# Patient Record
Sex: MALE | Race: WHITE | Employment: FULL TIME | URBAN - METROPOLITAN AREA
[De-identification: names, ages, dates, MRNs, and addresses within clinical notes are randomized per-mention and may not be internally consistent; named-entity substitution may affect disease eponyms.]

---

## 2018-02-26 ENCOUNTER — OFFICE VISIT (OUTPATIENT)
Dept: INTERNAL MEDICINE CLINIC | Facility: CLINIC | Age: 23
End: 2018-02-26

## 2018-02-26 VITALS
HEIGHT: 74 IN | SYSTOLIC BLOOD PRESSURE: 119 MMHG | DIASTOLIC BLOOD PRESSURE: 69 MMHG | RESPIRATION RATE: 16 BRPM | TEMPERATURE: 98.8 F | WEIGHT: 141.2 LBS | HEART RATE: 99 BPM | BODY MASS INDEX: 18.12 KG/M2 | OXYGEN SATURATION: 97 %

## 2018-02-26 DIAGNOSIS — Z72.51 HIGH RISK SEXUAL BEHAVIOR: ICD-10-CM

## 2018-02-26 DIAGNOSIS — R63.6 UNDERWEIGHT: ICD-10-CM

## 2018-02-26 DIAGNOSIS — D17.1 LIPOMA OF TORSO: Primary | ICD-10-CM

## 2018-02-26 DIAGNOSIS — F33.41 RECURRENT MAJOR DEPRESSIVE DISORDER, IN PARTIAL REMISSION (HCC): ICD-10-CM

## 2018-02-26 DIAGNOSIS — F41.9 ANXIETY: ICD-10-CM

## 2018-02-26 RX ORDER — EMTRICITABINE AND TENOFOVIR DISOPROXIL FUMARATE 200; 300 MG/1; MG/1
TABLET, FILM COATED ORAL DAILY
Status: ON HOLD | COMMUNITY
End: 2021-09-13 | Stop reason: CLARIF

## 2018-02-26 RX ORDER — ACETAMINOPHEN 325 MG/1
TABLET ORAL
COMMUNITY
End: 2018-05-31 | Stop reason: ALTCHOICE

## 2018-02-26 RX ORDER — BUPROPION HYDROCHLORIDE 200 MG/1
200 TABLET, EXTENDED RELEASE ORAL 2 TIMES DAILY
COMMUNITY
End: 2018-08-14 | Stop reason: SDUPTHER

## 2018-02-26 NOTE — PROGRESS NOTES
HISTORY OF PRESENT ILLNESS  Jose Miguel Figueroa is a 23 y.o. male. Chief Complaint   Patient presents with    New Patient     establish care. General check up. Room 8     HPI  1) Mass RUQ x 1 week. No pain. No change in bowel or bladder habits. No problems with either. 2) Depression/Anxiety - Seeing Psych regularly. Taking Bupropion  mg BID. Underweight - lost weight with depression. Trying to gain weight now     3) UTD with Truvada follow ups at [facility]. Review of Systems   Constitutional: Negative for fever. Gastrointestinal: Negative for abdominal pain, blood in stool, constipation, diarrhea, nausea and vomiting. Genitourinary: Negative for dysuria, frequency, hematuria and urgency. Psychiatric/Behavioral: Positive for depression. Negative for substance abuse and suicidal ideas. The patient is nervous/anxious. Physical Exam   Constitutional: He is oriented to person, place, and time. He appears well-developed and well-nourished. No distress. HENT:   Head: Normocephalic and atraumatic. Neck: Neck supple. No JVD present. Cardiovascular: Normal rate, regular rhythm and normal heart sounds. Pulmonary/Chest: Effort normal and breath sounds normal. No respiratory distress. Abdominal: Soft. Bowel sounds are normal. He exhibits no distension and no mass. There is no tenderness. There is no rebound and no guarding. Small, smooth, mobile, soft lipoma-like superficial mass RUQ abdomen. Musculoskeletal: He exhibits no edema. Neurological: He is alert and oriented to person, place, and time. Skin: Skin is warm and dry. Psychiatric: He has a normal mood and affect. His behavior is normal. Judgment and thought content normal.   Nursing note and vitals reviewed. ASSESSMENT and PLAN    ICD-10-CM ICD-9-CM    1. Lipoma of torso D17.1 214.1 Reassured pt. 2. Underweight R63.6 783.22 TSH RFX ON ABNORMAL TO FREE T4      CBC WITH AUTOMATED DIFF   3.  Recurrent major depressive disorder, in partial remission ([facility] F33.41 296.35 Continue following up with Psych. I question if pt would be more easily able to gain weight on something other than Wellbutrin. Discussed this with pt.    4. Anxiety F41.9 300.00 See above   5. High risk sexual behavior Z72.51 V69.2 Continue Truvada and follow ups with [facility].

## 2018-02-26 NOTE — PROGRESS NOTES
Chief Complaint   Patient presents with    New Patient     establish care. General check up. Room 8     1. Have you been to the ER, urgent care clinic since your last visit? Hospitalized since your last visit? No    2. Have you seen or consulted any other health care providers outside of the [address] [place] [address] since your last visit? Include any pap smears or colon screening.  No     Health Maintenance Due   Topic Date Due    DTaP/Tdap/Td series (1 - Tdap) 03/19/2014    Influenza Age 5 to Adult  08/01/2017

## 2018-02-26 NOTE — MR AVS SNAPSHOT
[address] [place] [address] 
[phone] Patient: Tadeo Arceo MRN: [id number] MVS:9/92/3199 Visit Information Date & Time Provider Department Dept. Phone Encounter #  
 2/26/2018  2:15 PM [place] PA-C [facility] Internal Medicine [phone] [id number] Follow-up Instructions Return in about 6 months (around 8/26/2018) for Physical when convenient. Upcoming Health Maintenance Date Due DTaP/Tdap/Td series (1 - Tdap) 3/19/2014 Allergies as of 2/26/2018  Review Complete On: 2/26/2018 By: Katelin Royal LPN No Known Allergies Current Immunizations  Never Reviewed No immunizations on file. Not reviewed this visit You Were Diagnosed With   
  
 Codes Comments Lipoma of torso    -  Primary ICD-10-CM: D17.1 ICD-9-CM: 214.1 Underweight     ICD-10-CM: R63.6 ICD-9-CM: 783.22 Recurrent major depressive disorder[facility]     ICD-10-CM: F33.41 
ICD-9-CM: 296.35 Anxiety     ICD-10-CM: F41.9 ICD-9-CM: 300.00 Vitals BP Pulse Temp Resp Height(growth percentile) Weight(growth percentile)  
 119/69 (BP 1 Location: Left arm, BP Patient Position: Sitting) 99 98.8 °F (37.1 °C) (Oral) 16 6' 2\" (1.88 m) 141 lb 3.2 oz (64 kg) SpO2 BMI Smoking Status 97% 18.13 kg/m2 Never Smoker Vitals History BMI and BSA Data Body Mass Index Body Surface Area  
 18.13 kg/m 2 1.83 m 2 Preferred Pharmacy Pharmacy Name Phone [id number], [place] - [address] [facility] [address] [place] [address] [phone] Your Updated Medication List  
  
   
This list is accurate as of 2/26/18  2:45 PM.  Always use your most recent med list.  
  
  
  
  
 acetaminophen 325 mg tablet Commonly known as:  TYLENOL Take  by mouth every four (4) hours as needed for Pain. buPROPion  mg SR tablet Commonly known as:  Daniela Nim Take 200 mg by mouth two (2) times a day. TRUVADA 200-300 mg per tablet Generic drug:  emtricitabine-tenofovir (TDF) Take  by mouth daily. We Performed the Following CBC WITH AUTOMATED DIFF [37098 CPT(R)] TSH RFX ON ABNORMAL TO FREE T4 [[id number] Custom] Follow-up Instructions Return in about 6 months (around 8/26/2018) for Physical when convenient. Introducing [facility]! [facility] introduces [employer] patient portal. Now you can access parts of your medical record, email your doctor's office, and request medication refills online. 1. In your internet browser, go to [email or web address] 2. Click on the First Time User? Click Here link in the Sign In box. You will see the New Member Sign Up page. 3. Enter your [employer] Access Code exactly as it appears below. You will not need to use this code after youve completed the sign-up process. If you do not sign up before the expiration date, you must request a new code. · [employer] Access Code: [email or web address] Expires: 5/27/2018  1:59 PM 
 
4. Enter the last four digits of your Social Security Number (xxxx) and Date of Birth (mm/dd/yyyy) as indicated and click Submit. You will be taken to the next sign-up page. 5. Create a [employer] ID. This will be your [employer] login ID and cannot be changed, so think of one that is secure and easy to remember. 6. Create a [employer] password. You can change your password at any time. 7. Enter your Password Reset Question and Answer. This can be used at a later time if you forget your password. 8. Enter your e-mail address. You will receive e-mail notification when new information is available in [address] 9. Click Sign Up. You can now view and download portions of your medical record. 10. Click the Download Summary menu link to download a portable copy of your medical information. If you have questions, please visit the Frequently Asked Questions section of the [employer] website. Remember, [employer] is NOT to be used for urgent needs. For medical emergencies, dial 911. Now available from your iPhone and Android! Please provide this summary of care documentation to your next provider. If you have any questions after today's visit, please call [phone].

## 2018-02-27 LAB
BASOPHILS # BLD AUTO: 0 X10E3/UL (ref 0–0.2)
BASOPHILS NFR BLD AUTO: 0 %
EOSINOPHIL # BLD AUTO: 0 X10E3/UL (ref 0–0.4)
EOSINOPHIL NFR BLD AUTO: 1 %
ERYTHROCYTE [DISTWIDTH] IN BLOOD BY AUTOMATED COUNT: 12.6 % (ref 12.3–15.4)
HCT VFR BLD AUTO: 39.9 % (ref 37.5–51)
HGB BLD-MCNC: 13.7 G/DL (ref 13–17.7)
IMM GRANULOCYTES # BLD: 0 X10E3/UL (ref 0–0.1)
IMM GRANULOCYTES NFR BLD: 0 %
LYMPHOCYTES # BLD AUTO: 1.9 X10E3/UL (ref 0.7–3.1)
LYMPHOCYTES NFR BLD AUTO: 29 %
MCH RBC QN AUTO: 30.5 PG (ref 26.6–33)
MCHC RBC AUTO-ENTMCNC: 34.3 G/DL (ref 31.5–35.7)
MCV RBC AUTO: 89 FL (ref 79–97)
MONOCYTES # BLD AUTO: 0.4 X10E3/UL (ref 0.1–0.9)
MONOCYTES NFR BLD AUTO: 7 %
NEUTROPHILS # BLD AUTO: 4 X10E3/UL (ref 1.4–7)
NEUTROPHILS NFR BLD AUTO: 63 %
PLATELET # BLD AUTO: 328 X10E3/UL (ref 150–379)
RBC # BLD AUTO: 4.49 X10E6/UL (ref 4.14–5.8)
TSH SERPL DL<=0.005 MIU/L-ACNC: 1.05 UIU/ML (ref 0.45–4.5)
WBC # BLD AUTO: 6.4 X10E3/UL (ref 3.4–10.8)

## 2018-05-31 ENCOUNTER — OFFICE VISIT (OUTPATIENT)
Dept: INTERNAL MEDICINE CLINIC | Facility: CLINIC | Age: 23
End: 2018-05-31

## 2018-05-31 VITALS
RESPIRATION RATE: 18 BRPM | HEIGHT: 74 IN | WEIGHT: 145 LBS | TEMPERATURE: 98.6 F | SYSTOLIC BLOOD PRESSURE: 122 MMHG | BODY MASS INDEX: 18.61 KG/M2 | HEART RATE: 66 BPM | DIASTOLIC BLOOD PRESSURE: 94 MMHG

## 2018-05-31 DIAGNOSIS — R07.89 ATYPICAL CHEST PAIN: ICD-10-CM

## 2018-05-31 DIAGNOSIS — S29.011A PECTORALIS MUSCLE STRAIN, INITIAL ENCOUNTER: Primary | ICD-10-CM

## 2018-05-31 RX ORDER — LORAZEPAM 1 MG/1
TABLET ORAL
Refills: 0 | Status: ON HOLD | COMMUNITY
Start: 2018-05-30 | End: 2021-09-13 | Stop reason: CLARIF

## 2018-05-31 RX ORDER — LIDOCAINE 50 MG/G
PATCH TOPICAL
Refills: 0 | Status: ON HOLD | COMMUNITY
Start: 2018-05-30 | End: 2021-09-13 | Stop reason: CLARIF

## 2018-05-31 RX ORDER — IBUPROFEN 600 MG/1
TABLET ORAL
Refills: 0 | COMMUNITY
Start: 2018-05-30

## 2018-05-31 RX ORDER — MIRTAZAPINE 15 MG/1
TABLET, FILM COATED ORAL
Refills: 0 | Status: ON HOLD | COMMUNITY
Start: 2018-05-16 | End: 2021-09-13 | Stop reason: CLARIF

## 2018-05-31 RX ORDER — NAPROXEN 500 MG/1
500 TABLET ORAL 2 TIMES DAILY WITH MEALS
Qty: 60 TAB | Refills: 0 | Status: ON HOLD | OUTPATIENT
Start: 2018-05-31 | End: 2021-09-13 | Stop reason: CLARIF

## 2018-05-31 RX ORDER — DICLOFENAC SODIUM 10 MG/G
GEL TOPICAL
Refills: 0 | Status: ON HOLD | COMMUNITY
Start: 2018-05-30 | End: 2021-09-13 | Stop reason: CLARIF

## 2018-05-31 RX ORDER — BUSPIRONE HYDROCHLORIDE 10 MG/1
TABLET ORAL
Refills: 0 | Status: ON HOLD | COMMUNITY
Start: 2018-05-16 | End: 2021-09-13 | Stop reason: CLARIF

## 2018-05-31 RX ORDER — OXYCODONE AND ACETAMINOPHEN 5; 325 MG/1; MG/1
TABLET ORAL
Refills: 0 | Status: ON HOLD | COMMUNITY
Start: 2018-05-30 | End: 2021-09-13 | Stop reason: CLARIF

## 2018-05-31 NOTE — LETTER
NOTIFICATION TO WORK  
 
5/31/2018 10:41 AM 
 
Mr. Jaycob Smiley [facility] [address] [phone] To Whom It May Concern: 
 
Jaycob Smiley is currently under the care of Sima Ash. He has had what I suspect is a work related injury. He will be following up with a sports medicine doctor to investigate this further. If there are questions or concerns please have the patient contact our office.  
 
 
 
Sincerely, 
 
 
Estefany Bradley NP

## 2018-05-31 NOTE — PROGRESS NOTES
Subjective:      Sean García is a 20 y.o. male who presents today for follow up from ED for chest pain. ED follow up: he went to the [facility] ED twice yesterday for chest pain. CXR, labs, ddimer negative. Patient was discharged home with ativan, pain meds, diclofenac, and ibuprofen. He currently notes 2/10 chest pain to left upper chest. He states pain increases with movement. He states the only medication that helped was ativan, he was discharged with one pill. He states when he is tense and moving it worsens. Pain meds do help. 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 Reviewed [facility] medical records    Patient Active Problem List    Diagnosis Date Noted    Underweight 02/26/2018    Anxiety 02/26/2018    High risk sexual behavior 02/26/2018    Depression     Arthritis      Current Outpatient Prescriptions   Medication Sig Dispense Refill    oxyCODONE-acetaminophen (PERCOCET) 5-325 mg per tablet   0    LORazepam (ATIVAN) 1 mg tablet   0    ibuprofen (MOTRIN) 600 mg tablet   0    diclofenac (VOLTAREN) 1 % gel   0    busPIRone (BUSPAR) 10 mg tablet take 1 tablet by mouth twice a day  0    naproxen (NAPROSYN) 500 mg tablet Take 1 Tab by mouth two (2) times daily (with meals). 60 Tab 0    buPROPion SR (WELLBUTRIN, ZYBAN) 200 mg SR tablet Take 200 mg by mouth two (2) times a day.  emtricitabine-tenofovir, TDF, (TRUVADA) 200-300 mg per tablet Take  by mouth daily.  mirtazapine (REMERON) 15 mg tablet TAKE 1 TABLET BY MOUTH AT BEDTIME  0    lidocaine (LIDODERM) 5 %   0     No Known Allergies  Past Medical History:   Diagnosis Date    Arthritis     Depression         Review of Systems    A comprehensive review of systems was negative except for that written in the HPI.      Objective:     Visit Vitals    BP (!) 122/94    Pulse 66    Temp 98.6 °F (37 °C) (Oral)    Resp 18    Ht 6' 2\" (1.88 m)    Wt 145 lb (65.8 kg)    BMI 18.62 kg/m2     General appearance: alert, cooperative, no distress, appears stated age  Head: Normocephalic, without obvious abnormality, atraumatic  Eyes: negative  Lungs: clear to auscultation bilaterally  Chest wall: no tenderness to sternum, discomfort with deep palpation to left pectoral muscle  Heart: regular rate and rhythm, S1, S2 normal, no murmur, click, rub or gallop  Extremities: extremities normal, atraumatic, no cyanosis or edema. Left shoulder with FROM, apprehension sign negative. Pulses: 2+ and symmetric  Skin: Skin color, texture, turgor normal. No rashes or lesions  Neurologic: Alert and oriented X 3, normal strength and tone. Normal symmetric reflexes. Normal coordination and gait  Psych: appropriate mood, speech, affect  Nursing note and vitals reviewed  Assessment/Plan:       ICD-10-CM ICD-9-CM    1. Pectoralis muscle strain, initial encounter S29.011A 848.8 REFERRAL TO SPORTS MEDICINE      naproxen (NAPROSYN) 500 mg tablet   2. Atypical chest pain R07.89 786.59    Injury likely from work, work notes written, referral placed for further evaluation to Dr. Thania Vallecillo  Follow-up Disposition:  Return if symptoms worsen or fail to improve. Advised him to call back or return to office if symptoms worsen/change/persist.  Discussed expected course/resolution/complications of diagnosis in detail with patient. Medication risks/benefits/costs/interactions/alternatives discussed with patient. He was given an after visit summary which includes diagnoses, current medications, & vitals. He expressed understanding with the diagnosis and plan.

## 2018-05-31 NOTE — LETTER
NOTIFICATION RETURN TO WORK  
 
5/31/2018 10:41 AM 
 
Mr. Bryan Masters [facility] [address] [phone] To Whom It May Concern: 
 
Bryan Masters is currently under the care of Sima Ash. He will return to work on Monday June 4th. Please excuse him until then as he has an injury that makes it so he cannot work. If there are questions or concerns please have the patient contact our office.  
 
 
 
Sincerely, 
 
 
Caroline Morgan NP

## 2018-05-31 NOTE — PROGRESS NOTES
Chief Complaint   Patient presents with   [facility] Follow Up     went to ED x 2 [facility] for chest pain. 1. Have you been to the ER, urgent care clinic since your last visit? Hospitalized since your last visit? Yes When: 5/30/18 Where: [facility] Reason for visit: Chest pain    2. Have you seen or consulted any other health care providers outside of the [address] [place] [address] since your last visit? Include any pap smears or colon screening.  No

## 2021-08-25 ENCOUNTER — OFFICE VISIT (OUTPATIENT)
Dept: SURGERY | Age: 26
End: 2021-08-25
Payer: COMMERCIAL

## 2021-08-25 VITALS
OXYGEN SATURATION: 97 % | WEIGHT: 144.6 LBS | TEMPERATURE: 98.9 F | BODY MASS INDEX: 19.16 KG/M2 | HEART RATE: 86 BPM | DIASTOLIC BLOOD PRESSURE: 75 MMHG | SYSTOLIC BLOOD PRESSURE: 120 MMHG | HEIGHT: 73 IN | RESPIRATION RATE: 18 BRPM

## 2021-08-25 DIAGNOSIS — M79.89 SOFT TISSUE MASS: Primary | ICD-10-CM

## 2021-08-25 PROCEDURE — 99203 OFFICE O/P NEW LOW 30 MIN: CPT | Performed by: SURGERY

## 2021-08-25 RX ORDER — LAMOTRIGINE 150 MG/1
300 TABLET ORAL DAILY
COMMUNITY

## 2021-08-25 RX ORDER — TRAZODONE HYDROCHLORIDE 50 MG/1
50 TABLET ORAL
COMMUNITY

## 2021-08-25 NOTE — H&P (VIEW-ONLY)
General Surgery Office Consultation / H & P    CC: Left leg mass  History of Present Illness:      Starr Emerson is a 27 y.o. male who presents with left leg mass. Patient reports he had a slowly growing left leg mass for many years. This mass is not painful. Pain is 0 out of 10. No provoking or relieving factors. No drainage. No erythema. No fever chills. This mass is now unsightly and he can see it through his clothing. You are to discuss surgical excision. No other masses on his body. Past Medical History:   Diagnosis Date    Arthritis     Depression      Past Surgical History:   Procedure Laterality Date    HX APPENDECTOMY      HX WISDOM TEETH EXTRACTION        Family History   Problem Relation Age of Onset    Depression Mother     Heart Failure Maternal Grandmother     Cancer Maternal Grandmother         Breast    Cancer Paternal Grandfather         prostate     Social History     Socioeconomic History    Marital status:      Spouse name: Not on file    Number of children: Not on file    Years of education: Not on file    Highest education level: Not on file   Occupational History    Occupation: Safety Environment   Tobacco Use    Smoking status: Former Smoker    Smokeless tobacco: Never Used   Substance and Sexual Activity    Alcohol use: Yes     Comment: 1-2x/week 5 drinks    Drug use: No    Sexual activity: Yes     Partners: Male   Social History Narrative    02/26/18    Lives with 2 dogs. Feeling safe at home. Social Determinants of Health     Financial Resource Strain:     Difficulty of Paying Living Expenses:    Food Insecurity:     Worried About Running Out of Food in the Last Year:     [address] in the Last Year:    Transportation Needs:     Lack of Transportation (Medical):      Lack of Transportation (Non-Medical):    Physical Activity:     Days of Exercise per Week:     Minutes of Exercise per Session:    Stress:     Feeling of Stress : Social Connections:     Frequency of Communication with Friends and Family:     Frequency of Social Gatherings with Friends and Family:     Attends Adventism Services:     Active Member of Clubs or Organizations:     Attends Club or Organization Meetings:     Marital Status:       Prior to Admission medications    Medication Sig Start Date End Date Taking? Authorizing Provider   lamoTRIgine (LaMICtaL) 150 mg tablet Take 300 mg by mouth daily. Yes Provider, Historical   traZODone (DESYREL) 50 mg tablet Take 50 mg by mouth nightly. prn   Yes Provider, Historical   naproxen (NAPROSYN) 500 mg tablet Take 1 Tab by mouth two (2) times daily (with meals). 5/31/18  Yes Skiff, Doris Caras, NP   buPROPion SR (WELLBUTRIN, ZYBAN) 200 mg SR tablet Take 1 Tab by mouth two (2) times a day. Patient not taking: Reported on 8/25/2021 8/14/18   Kavita Seo PA-C   oxyCODONE-acetaminophen (PERCOCET) 5-325 mg per tablet  5/30/18   Provider, Historical   mirtazapine (REMERON) 15 mg tablet TAKE 1 TABLET BY MOUTH AT BEDTIME  Patient not taking: Reported on 8/25/2021 5/16/18   Provider, Historical   LORazepam (ATIVAN) 1 mg tablet  5/30/18   Provider, Historical   lidocaine (LIDODERM) 5 %  5/30/18   Provider, Historical   ibuprofen (MOTRIN) 600 mg tablet  5/30/18   Provider, Historical   diclofenac (VOLTAREN) 1 % gel  5/30/18   Provider, Historical   busPIRone (BUSPAR) 10 mg tablet take 1 tablet by mouth twice a day  Patient not taking: Reported on 8/25/2021 5/16/18   Provider, Historical   emtricitabine-tenofovir, TDF, (TRUVADA) 200-300 mg per tablet Take  by mouth daily.   Patient not taking: Reported on 8/25/2021    Provider, Historical     No Known Allergies    Review of Systems:  Constitutional: No fever or chills  Neurologic: No headache  Eyes: No scleral icterus or irritated eyes  Nose: No nasal pain or drainage  Mouth: No oral lesions or sore throat  Cardiac: No palpations or chest pain  Pulmonary: No cough or shortness or breath  Gastrointestinal: No nausea, emesis, diarrhea, or constipation  Genitourinary: No dysuria  Musculoskeletal: No muscle or joint tenderness  Skin: Left shin mass  Psychiatric: No anxiety or depressed mood    Physical Exam:     Visit Vitals  /75   Pulse 86   Temp 98.9 °F (37.2 °C) (Oral)   Resp 18   Ht 6' 1\" (1.854 m)   Wt 144 lb 9.6 oz (65.6 kg)   SpO2 97%   BMI 19.08 kg/m²     General: No acute distress, conversant  Eyes: PERRLA, no scleral icterus  HENT: Normocephalic without oral lesions  Neck: Trachea midline without LAD  Cardiac: Normal pulse rate and rhythm  Pulmonary: Symmetric chest rise with normal effort  GI: Soft, NT, ND, no hernia, no splenomegaly  Skin: Warm without rash  Extremities: 2 x 3 cm left anterior shin subcutaneous mass likely lipoma or neurofibroma  Psych: Appropriate mood and affect    Assessment:     20-year-old male with left subcutaneous anterior shin subcutaneous mass    Plan:     I think this mass can easily be excised and closed by merrily. We discussed the risk of bleeding and infection. I suggest doing this excision under MAC and local in case to have to raise skin flaps. This can be an outpatient surgery. No preoperative work-up needed. We will schedule surgery at his nearest convenience.       Signed By: Soledad Luong MD  Bariatric and General Surgeon  [facility]    August 25, 2021

## 2021-08-25 NOTE — PROGRESS NOTES
General Surgery Office Consultation / H & P    CC: Left leg mass  History of Present Illness:      Madiha Hagen is a 27 y.o. male who presents with left leg mass. Patient reports he had a slowly growing left leg mass for many years. This mass is not painful. Pain is 0 out of 10. No provoking or relieving factors. No drainage. No erythema. No fever chills. This mass is now unsightly and he can see it through his clothing. You are to discuss surgical excision. No other masses on his body. Past Medical History:   Diagnosis Date    Arthritis     Depression      Past Surgical History:   Procedure Laterality Date    HX APPENDECTOMY      HX WISDOM TEETH EXTRACTION        Family History   Problem Relation Age of Onset    Depression Mother     Heart Failure Maternal Grandmother     Cancer Maternal Grandmother         Breast    Cancer Paternal Grandfather         prostate     Social History     Socioeconomic History    Marital status:      Spouse name: Not on file    Number of children: Not on file    Years of education: Not on file    Highest education level: Not on file   Occupational History    Occupation: Safety Environment   Tobacco Use    Smoking status: Former Smoker    Smokeless tobacco: Never Used   Substance and Sexual Activity    Alcohol use: Yes     Comment: 1-2x/week 5 drinks    Drug use: No    Sexual activity: Yes     Partners: Male   Social History Narrative    02/26/18    Lives with 2 dogs. Feeling safe at home. Social Determinants of Health     Financial Resource Strain:     Difficulty of Paying Living Expenses:    Food Insecurity:     Worried About Running Out of Food in the Last Year:     [address] in the Last Year:    Transportation Needs:     Lack of Transportation (Medical):      Lack of Transportation (Non-Medical):    Physical Activity:     Days of Exercise per Week:     Minutes of Exercise per Session:    Stress:     Feeling of Stress : Social Connections:     Frequency of Communication with Friends and Family:     Frequency of Social Gatherings with Friends and Family:     Attends Mormon Services:     Active Member of Clubs or Organizations:     Attends Club or Organization Meetings:     Marital Status:       Prior to Admission medications    Medication Sig Start Date End Date Taking? Authorizing Provider   lamoTRIgine (LaMICtaL) 150 mg tablet Take 300 mg by mouth daily. Yes Provider, Historical   traZODone (DESYREL) 50 mg tablet Take 50 mg by mouth nightly. prn   Yes Provider, Historical   naproxen (NAPROSYN) 500 mg tablet Take 1 Tab by mouth two (2) times daily (with meals). 5/31/18  Yes Skiff, Valdene Eriksson, NP   buPROPion SR (WELLBUTRIN, ZYBAN) 200 mg SR tablet Take 1 Tab by mouth two (2) times a day. Patient not taking: Reported on 8/25/2021 8/14/18   Misha Garcia PA-C   oxyCODONE-acetaminophen (PERCOCET) 5-325 mg per tablet  5/30/18   Provider, Historical   mirtazapine (REMERON) 15 mg tablet TAKE 1 TABLET BY MOUTH AT BEDTIME  Patient not taking: Reported on 8/25/2021 5/16/18   Provider, Historical   LORazepam (ATIVAN) 1 mg tablet  5/30/18   Provider, Historical   lidocaine (LIDODERM) 5 %  5/30/18   Provider, Historical   ibuprofen (MOTRIN) 600 mg tablet  5/30/18   Provider, Historical   diclofenac (VOLTAREN) 1 % gel  5/30/18   Provider, Historical   busPIRone (BUSPAR) 10 mg tablet take 1 tablet by mouth twice a day  Patient not taking: Reported on 8/25/2021 5/16/18   Provider, Historical   emtricitabine-tenofovir, TDF, (TRUVADA) 200-300 mg per tablet Take  by mouth daily.   Patient not taking: Reported on 8/25/2021    Provider, Historical     No Known Allergies    Review of Systems:  Constitutional: No fever or chills  Neurologic: No headache  Eyes: No scleral icterus or irritated eyes  Nose: No nasal pain or drainage  Mouth: No oral lesions or sore throat  Cardiac: No palpations or chest pain  Pulmonary: No cough or shortness or breath  Gastrointestinal: No nausea, emesis, diarrhea, or constipation  Genitourinary: No dysuria  Musculoskeletal: No muscle or joint tenderness  Skin: Left shin mass  Psychiatric: No anxiety or depressed mood    Physical Exam:     Visit Vitals  /75   Pulse 86   Temp 98.9 °F (37.2 °C) (Oral)   Resp 18   Ht 6' 1\" (1.854 m)   Wt 144 lb 9.6 oz (65.6 kg)   SpO2 97%   BMI 19.08 kg/m²     General: No acute distress, conversant  Eyes: PERRLA, no scleral icterus  HENT: Normocephalic without oral lesions  Neck: Trachea midline without LAD  Cardiac: Normal pulse rate and rhythm  Pulmonary: Symmetric chest rise with normal effort  GI: Soft, NT, ND, no hernia, no splenomegaly  Skin: Warm without rash  Extremities: 2 x 3 cm left anterior shin subcutaneous mass likely lipoma or neurofibroma  Psych: Appropriate mood and affect    Assessment:     25-year-old male with left subcutaneous anterior shin subcutaneous mass    Plan:     I think this mass can easily be excised and closed by merrily. We discussed the risk of bleeding and infection. I suggest doing this excision under MAC and local in case to have to raise skin flaps. This can be an outpatient surgery. No preoperative work-up needed. We will schedule surgery at his nearest convenience.       Signed By: Bowen Singleton MD  Bariatric and General Surgeon  [facility]    August 25, 2021

## 2021-08-25 NOTE — PROGRESS NOTES
1. Have you been to the ER, urgent care clinic since your last visit? Hospitalized since your last visit? 8/24/21 [facility] for Cough and STD check. 2. Have you seen or consulted any other health care providers outside of the [address] [place] [address] since your last visit? Include any pap smears or colon screening. [facility] 8/24/21.

## 2021-08-26 ENCOUNTER — TELEPHONE (OUTPATIENT)
Dept: SURGERY | Age: 26
End: 2021-08-26

## 2021-08-26 NOTE — TELEPHONE ENCOUNTER
Spoke with patient regarding questions about general anesthesia. Per Dr. Stewart January he is not using general anesthesia. It is call MAC. It will make you feel loopy. He also will be using a local to numb the area. Patient voiced understanding.

## 2021-09-07 ENCOUNTER — TRANSCRIBE ORDER (OUTPATIENT)
Dept: REGISTRATION | Age: 26
End: 2021-09-07

## 2021-09-07 DIAGNOSIS — Z01.812 ENCOUNTER FOR PREOPERATIVE SCREENING LABORATORY TESTING FOR COVID-19 VIRUS: Primary | ICD-10-CM

## 2021-09-07 DIAGNOSIS — Z20.822 ENCOUNTER FOR PREOPERATIVE SCREENING LABORATORY TESTING FOR COVID-19 VIRUS: Primary | ICD-10-CM

## 2021-09-08 ENCOUNTER — HOSPITAL ENCOUNTER (OUTPATIENT)
Dept: PREADMISSION TESTING | Age: 26
Discharge: HOME | End: 2021-09-08
Payer: COMMERCIAL

## 2021-09-08 DIAGNOSIS — Z01.812 ENCOUNTER FOR PREOPERATIVE SCREENING LABORATORY TESTING FOR COVID-19 VIRUS: ICD-10-CM

## 2021-09-08 DIAGNOSIS — Z20.822 ENCOUNTER FOR PREOPERATIVE SCREENING LABORATORY TESTING FOR COVID-19 VIRUS: ICD-10-CM

## 2021-09-08 PROCEDURE — U0003 INFECTIOUS AGENT DETECTION BY NUCLEIC ACID (DNA OR RNA); SEVERE ACUTE RESPIRATORY SYNDROME CORONAVIRUS 2 (SARS-COV-2) (CORONAVIRUS DISEASE [COVID-19]), AMPLIFIED PROBE TECHNIQUE, MAKING USE OF HIGH THROUGHPUT TECHNOLOGIES AS DESCRIBED BY CMS-2020-01-R: HCPCS

## 2021-09-09 LAB
SARS-COV-2, XPLCVT: NOT DETECTED
SOURCE, COVRS: NORMAL

## 2021-09-13 ENCOUNTER — ANESTHESIA (OUTPATIENT)
Dept: SURGERY | Age: 26
End: 2021-09-13
Payer: COMMERCIAL

## 2021-09-13 ENCOUNTER — ANESTHESIA EVENT (OUTPATIENT)
Dept: SURGERY | Age: 26
End: 2021-09-13
Payer: COMMERCIAL

## 2021-09-13 ENCOUNTER — HOSPITAL ENCOUNTER (OUTPATIENT)
Age: 26
Setting detail: OUTPATIENT SURGERY
Discharge: HOME | End: 2021-09-13
Attending: SURGERY | Admitting: SURGERY
Payer: COMMERCIAL

## 2021-09-13 VITALS
BODY MASS INDEX: 19.48 KG/M2 | SYSTOLIC BLOOD PRESSURE: 106 MMHG | HEIGHT: 73 IN | RESPIRATION RATE: 16 BRPM | WEIGHT: 147 LBS | TEMPERATURE: 97.6 F | DIASTOLIC BLOOD PRESSURE: 70 MMHG | OXYGEN SATURATION: 100 % | HEART RATE: 59 BPM

## 2021-09-13 DIAGNOSIS — R22.42 LEG MASS, LEFT: Primary | ICD-10-CM

## 2021-09-13 PROCEDURE — 77030031139 HC SUT VCRL2 J&J -A: Performed by: SURGERY

## 2021-09-13 PROCEDURE — 77030002933 HC SUT MCRYL J&J -A: Performed by: SURGERY

## 2021-09-13 PROCEDURE — 74011000250 HC RX REV CODE- 250: Performed by: SURGERY

## 2021-09-13 PROCEDURE — 76060000032 HC ANESTHESIA 0.5 TO 1 HR: Performed by: SURGERY

## 2021-09-13 PROCEDURE — 77030002996 HC SUT SLK J&J -A: Performed by: SURGERY

## 2021-09-13 PROCEDURE — 2709999900 HC NON-CHARGEABLE SUPPLY: Performed by: SURGERY

## 2021-09-13 PROCEDURE — 74011250636 HC RX REV CODE- 250/636: Performed by: ANESTHESIOLOGY

## 2021-09-13 PROCEDURE — 74011250636 HC RX REV CODE- 250/636: Performed by: SURGERY

## 2021-09-13 PROCEDURE — 88307 TISSUE EXAM BY PATHOLOGIST: CPT

## 2021-09-13 PROCEDURE — 77030010507 HC ADH SKN DERMBND J&J -B: Performed by: SURGERY

## 2021-09-13 PROCEDURE — 76210000063 HC OR PH I REC FIRST 0.5 HR: Performed by: SURGERY

## 2021-09-13 PROCEDURE — 11404 EXC TR-EXT B9+MARG 3.1-4 CM: CPT | Performed by: SURGERY

## 2021-09-13 PROCEDURE — 74011250637 HC RX REV CODE- 250/637: Performed by: ANESTHESIOLOGY

## 2021-09-13 PROCEDURE — 77030042556 HC PNCL CAUT -B: Performed by: SURGERY

## 2021-09-13 PROCEDURE — 12032 INTMD RPR S/A/T/EXT 2.6-7.5: CPT | Performed by: SURGERY

## 2021-09-13 PROCEDURE — 74011250636 HC RX REV CODE- 250/636: Performed by: NURSE ANESTHETIST, CERTIFIED REGISTERED

## 2021-09-13 PROCEDURE — 77030040361 HC SLV COMPR DVT MDII -B: Performed by: SURGERY

## 2021-09-13 PROCEDURE — 76010000138 HC OR TIME 0.5 TO 1 HR: Performed by: SURGERY

## 2021-09-13 PROCEDURE — 77030040922 HC BLNKT HYPOTHRM STRY -A

## 2021-09-13 RX ORDER — ONDANSETRON 2 MG/ML
4 INJECTION INTRAMUSCULAR; INTRAVENOUS AS NEEDED
Status: DISCONTINUED | OUTPATIENT
Start: 2021-09-13 | End: 2021-09-13 | Stop reason: HOSPADM

## 2021-09-13 RX ORDER — SODIUM CHLORIDE 0.9 % (FLUSH) 0.9 %
5-40 SYRINGE (ML) INJECTION EVERY 8 HOURS
Status: DISCONTINUED | OUTPATIENT
Start: 2021-09-13 | End: 2021-09-13 | Stop reason: HOSPADM

## 2021-09-13 RX ORDER — SODIUM CHLORIDE, SODIUM LACTATE, POTASSIUM CHLORIDE, CALCIUM CHLORIDE 600; 310; 30; 20 MG/100ML; MG/100ML; MG/100ML; MG/100ML
125 INJECTION, SOLUTION INTRAVENOUS CONTINUOUS
Status: DISCONTINUED | OUTPATIENT
Start: 2021-09-13 | End: 2021-09-13 | Stop reason: HOSPADM

## 2021-09-13 RX ORDER — MIDAZOLAM HYDROCHLORIDE 1 MG/ML
1 INJECTION, SOLUTION INTRAMUSCULAR; INTRAVENOUS AS NEEDED
Status: DISCONTINUED | OUTPATIENT
Start: 2021-09-13 | End: 2021-09-13 | Stop reason: HOSPADM

## 2021-09-13 RX ORDER — SODIUM CHLORIDE 0.9 % (FLUSH) 0.9 %
5-40 SYRINGE (ML) INJECTION AS NEEDED
Status: DISCONTINUED | OUTPATIENT
Start: 2021-09-13 | End: 2021-09-13 | Stop reason: HOSPADM

## 2021-09-13 RX ORDER — LIDOCAINE HYDROCHLORIDE 10 MG/ML
0.1 INJECTION, SOLUTION EPIDURAL; INFILTRATION; INTRACAUDAL; PERINEURAL AS NEEDED
Status: DISCONTINUED | OUTPATIENT
Start: 2021-09-13 | End: 2021-09-13 | Stop reason: HOSPADM

## 2021-09-13 RX ORDER — FENTANYL CITRATE 50 UG/ML
25 INJECTION, SOLUTION INTRAMUSCULAR; INTRAVENOUS
Status: DISCONTINUED | OUTPATIENT
Start: 2021-09-13 | End: 2021-09-13 | Stop reason: HOSPADM

## 2021-09-13 RX ORDER — FENTANYL CITRATE 50 UG/ML
50 INJECTION, SOLUTION INTRAMUSCULAR; INTRAVENOUS AS NEEDED
Status: DISCONTINUED | OUTPATIENT
Start: 2021-09-13 | End: 2021-09-13 | Stop reason: HOSPADM

## 2021-09-13 RX ORDER — PROPOFOL 10 MG/ML
INJECTION, EMULSION INTRAVENOUS AS NEEDED
Status: DISCONTINUED | OUTPATIENT
Start: 2021-09-13 | End: 2021-09-13 | Stop reason: HOSPADM

## 2021-09-13 RX ORDER — DIPHENHYDRAMINE HYDROCHLORIDE 50 MG/ML
12.5 INJECTION, SOLUTION INTRAMUSCULAR; INTRAVENOUS AS NEEDED
Status: DISCONTINUED | OUTPATIENT
Start: 2021-09-13 | End: 2021-09-13 | Stop reason: HOSPADM

## 2021-09-13 RX ORDER — OXYCODONE AND ACETAMINOPHEN 5; 325 MG/1; MG/1
1 TABLET ORAL
Qty: 10 TABLET | Refills: 0 | Status: SHIPPED | OUTPATIENT
Start: 2021-09-13 | End: 2021-09-16

## 2021-09-13 RX ORDER — MIDAZOLAM HYDROCHLORIDE 1 MG/ML
0.5 INJECTION, SOLUTION INTRAMUSCULAR; INTRAVENOUS
Status: DISCONTINUED | OUTPATIENT
Start: 2021-09-13 | End: 2021-09-13 | Stop reason: HOSPADM

## 2021-09-13 RX ORDER — SODIUM CHLORIDE 9 MG/ML
50 INJECTION, SOLUTION INTRAVENOUS CONTINUOUS
Status: DISCONTINUED | OUTPATIENT
Start: 2021-09-13 | End: 2021-09-13 | Stop reason: HOSPADM

## 2021-09-13 RX ORDER — FENTANYL CITRATE 50 UG/ML
INJECTION, SOLUTION INTRAMUSCULAR; INTRAVENOUS AS NEEDED
Status: DISCONTINUED | OUTPATIENT
Start: 2021-09-13 | End: 2021-09-13 | Stop reason: HOSPADM

## 2021-09-13 RX ORDER — ACETAMINOPHEN 325 MG/1
650 TABLET ORAL ONCE
Status: COMPLETED | OUTPATIENT
Start: 2021-09-13 | End: 2021-09-13

## 2021-09-13 RX ORDER — DEXAMETHASONE SODIUM PHOSPHATE 4 MG/ML
INJECTION, SOLUTION INTRA-ARTICULAR; INTRALESIONAL; INTRAMUSCULAR; INTRAVENOUS; SOFT TISSUE AS NEEDED
Status: DISCONTINUED | OUTPATIENT
Start: 2021-09-13 | End: 2021-09-13 | Stop reason: HOSPADM

## 2021-09-13 RX ORDER — SODIUM CHLORIDE 9 MG/ML
1000 INJECTION, SOLUTION INTRAVENOUS CONTINUOUS
Status: DISCONTINUED | OUTPATIENT
Start: 2021-09-13 | End: 2021-09-13 | Stop reason: HOSPADM

## 2021-09-13 RX ORDER — EPHEDRINE SULFATE/0.9% NACL/PF 50 MG/5 ML
5 SYRINGE (ML) INTRAVENOUS AS NEEDED
Status: DISCONTINUED | OUTPATIENT
Start: 2021-09-13 | End: 2021-09-13 | Stop reason: HOSPADM

## 2021-09-13 RX ORDER — OXYCODONE AND ACETAMINOPHEN 5; 325 MG/1; MG/1
1 TABLET ORAL AS NEEDED
Status: DISCONTINUED | OUTPATIENT
Start: 2021-09-13 | End: 2021-09-13 | Stop reason: HOSPADM

## 2021-09-13 RX ORDER — BUPIVACAINE HYDROCHLORIDE AND EPINEPHRINE 5; 5 MG/ML; UG/ML
INJECTION, SOLUTION EPIDURAL; INTRACAUDAL; PERINEURAL AS NEEDED
Status: DISCONTINUED | OUTPATIENT
Start: 2021-09-13 | End: 2021-09-13 | Stop reason: HOSPADM

## 2021-09-13 RX ORDER — SODIUM CHLORIDE, SODIUM LACTATE, POTASSIUM CHLORIDE, CALCIUM CHLORIDE 600; 310; 30; 20 MG/100ML; MG/100ML; MG/100ML; MG/100ML
INJECTION, SOLUTION INTRAVENOUS
Status: DISCONTINUED | OUTPATIENT
Start: 2021-09-13 | End: 2021-09-13 | Stop reason: HOSPADM

## 2021-09-13 RX ORDER — MIDAZOLAM HYDROCHLORIDE 1 MG/ML
INJECTION, SOLUTION INTRAMUSCULAR; INTRAVENOUS AS NEEDED
Status: DISCONTINUED | OUTPATIENT
Start: 2021-09-13 | End: 2021-09-13 | Stop reason: HOSPADM

## 2021-09-13 RX ORDER — MORPHINE SULFATE 2 MG/ML
2 INJECTION, SOLUTION INTRAMUSCULAR; INTRAVENOUS
Status: DISCONTINUED | OUTPATIENT
Start: 2021-09-13 | End: 2021-09-13 | Stop reason: HOSPADM

## 2021-09-13 RX ORDER — HYDROMORPHONE HYDROCHLORIDE 1 MG/ML
0.2 INJECTION, SOLUTION INTRAMUSCULAR; INTRAVENOUS; SUBCUTANEOUS
Status: DISCONTINUED | OUTPATIENT
Start: 2021-09-13 | End: 2021-09-13 | Stop reason: HOSPADM

## 2021-09-13 RX ADMIN — ACETAMINOPHEN 650 MG: 325 TABLET ORAL at 09:50

## 2021-09-13 RX ADMIN — SODIUM CHLORIDE, POTASSIUM CHLORIDE, SODIUM LACTATE AND CALCIUM CHLORIDE: 600; 310; 30; 20 INJECTION, SOLUTION INTRAVENOUS at 10:20

## 2021-09-13 RX ADMIN — FENTANYL CITRATE 100 MCG: 50 INJECTION, SOLUTION INTRAMUSCULAR; INTRAVENOUS at 10:36

## 2021-09-13 RX ADMIN — WATER 2 G: 1 INJECTION INTRAMUSCULAR; INTRAVENOUS; SUBCUTANEOUS at 10:37

## 2021-09-13 RX ADMIN — DEXAMETHASONE SODIUM PHOSPHATE 4 MG: 4 INJECTION, SOLUTION INTRAMUSCULAR; INTRAVENOUS at 10:56

## 2021-09-13 RX ADMIN — PROPOFOL 100 MG: 10 INJECTION, EMULSION INTRAVENOUS at 10:36

## 2021-09-13 RX ADMIN — SODIUM CHLORIDE, POTASSIUM CHLORIDE, SODIUM LACTATE AND CALCIUM CHLORIDE 125 ML/HR: 600; 310; 30; 20 INJECTION, SOLUTION INTRAVENOUS at 09:46

## 2021-09-13 RX ADMIN — SODIUM CHLORIDE, POTASSIUM CHLORIDE, SODIUM LACTATE AND CALCIUM CHLORIDE: 600; 310; 30; 20 INJECTION, SOLUTION INTRAVENOUS at 10:56

## 2021-09-13 RX ADMIN — PROPOFOL 20 MG: 10 INJECTION, EMULSION INTRAVENOUS at 10:47

## 2021-09-13 RX ADMIN — MIDAZOLAM 2 MG: 1 INJECTION INTRAMUSCULAR; INTRAVENOUS at 10:22

## 2021-09-13 NOTE — OP NOTES
PROCEDURE NOTE    Date of Procedure: 9/13/2021     Preoperative Diagnosis: Left leg mass  Postoperative Diagnosis: same    Procedure: Excision of left leg mass    Surgeon: Tha Gramajo MD    Surgical Staff: Circ-1: Jenny Carr RN  Scrub RN-1: Vida Medrano RN      Anesthesia: MAC/local    Indications: 25-year-old male with left leg subcutaneous fatty mass    Findings: 3 cm left leg subcutaneous fatty mass anterior to the fascia. Description of Operation: Mariola Galindo was identified. Informed consent was obtained after a complete discussion of risks, benefits and alternatives to surgery were had with the patient. The patient was placed under MAC. The patient was then prepped and draped in the usual sterile fashion. The patient was injected with local anesthesia. We began with a 4 cm longitudinal incision over the left anterior shin. Using electrocautery we dissected down to reach the capsule. We then used electro- cautery to circumferentially dissect around the capsule and free this fatty mass from the fascia overlying the left tibia. The specimen was passed off the field. Specimen measured 3 cm x 3 cm. We ensured hemostasis. We then closed the deep dermal layer with 2-0 Vicryl followed by 4-0 Monocryl for the skin. We applied Dermabond. We injected more local anesthetic. At the end of the procedure all instrument, needle, and sponge counts were correct. The patient was sent to PACU in stable condition.     Estimated Blood Loss:  Minimal    Specimens:   ID Type Source Tests Collected by Time Destination   1 : Left Leg Mass Fresh Leg, Left  Daphney Naylor MD 9/13/2021 1048 Pathology        Complications: None    Implants: * No implants in log *      Tha Gramajo MD  Bariatric and General Surgeon  [facility]  9/13/2021

## 2021-09-13 NOTE — ANESTHESIA POSTPROCEDURE EVALUATION
Procedure(s):  EXCISION OF LEFT LEG MASS (MAC/LOCAL). MAC    Anesthesia Post Evaluation        Patient location during evaluation: PACU  Patient participation: complete - patient participated  Level of consciousness: awake and alert  Pain management: adequate  Airway patency: patent  Anesthetic complications: no  Cardiovascular status: acceptable  Respiratory status: acceptable  Hydration status: acceptable  Comments: I have seen and evaluated the patient and is ready for discharge. Braden Jung MD    Post anesthesia nausea and vomiting:  none      INITIAL Post-op Vital signs:   Vitals Value Taken Time   /70 09/13/21 1130   Temp 36.4 °C (97.6 °F) 09/13/21 1108   Pulse 52 09/13/21 1132   Resp 14 09/13/21 1132   SpO2 100 % 09/13/21 1132   Vitals shown include unvalidated device data.

## 2021-09-13 NOTE — INTERVAL H&P NOTE
Update History & Physical    The Patient's History and Physical of 8/25/21 was reviewed with the patient and I examined the patient. There was no change. The surgical site was confirmed by the patient and me. Plan:  The risk, benefits, expected outcome, and alternative to the recommended procedure have been discussed with the patient. Patient understands and wants to proceed with the procedure.     Electronically signed by Sumit Newton MD on 9/13/2021 at 9:18 AM

## 2021-09-13 NOTE — ANESTHESIA PREPROCEDURE EVALUATION
Relevant Problems   NEUROLOGY   (+) Depression      ENDOCRINE   (+) Arthritis       Anesthetic History   No history of anesthetic complications            Review of Systems / Medical History  Patient summary reviewed, nursing notes reviewed and pertinent labs reviewed    Pulmonary  Within defined limits                 Neuro/Psych   Within defined limits           Cardiovascular  Within defined limits                     GI/Hepatic/Renal  Within defined limits              Endo/Other  Within defined limits           Other Findings              Physical Exam    Airway  Mallampati: I  TM Distance: > 6 cm  Neck ROM: normal range of motion   Mouth opening: Normal     Cardiovascular  Regular rate and rhythm,  S1 and S2 normal,  no murmur, click, rub, or gallop             Dental  No notable dental hx       Pulmonary  Breath sounds clear to auscultation               Abdominal  GI exam deferred       Other Findings            Anesthetic Plan    ASA: 1  Anesthesia type: MAC            Anesthetic plan and risks discussed with: Patient

## 2021-09-13 NOTE — DISCHARGE INSTRUCTIONS
Discharge Instructions for General Surgery Patients         1. Do not drive or operate machinery while taking sedating or narcotic medications. 2. Post operative pain is expected. Try to wean off narcotics as soon as able and take tylenol or NSAIDS. Do not take tylenol with Norco or Percocet as this may harm your liver. No NSAIDS if you are bariatric surgery patient. 3. You may walk as desired and go up and down stairs as needed. Walking is encouraged. 4. You may shower the day after surgery. Do not take tub baths, swim or use hot tubs for 2 weeks. Pat dry wounds after with a towel. 5. Leave glue on wounds. It will fall off with time. Do not scrub around incisions. If redness develops around the glue okay to peel off in hot shower. 6. Regular diet. Take Miralax once or twice a day as needed for constipation. 7. Follow up with provider as scheduled. 8. If you experience fever (greater than 101.5), chills, vomiting or redness or drainage at surgical site, please contact your surgeons office. If you have further questions or concerns, please call your surgeons office at [phone].            ______________________________________________________________________    Anesthesia Discharge Instructions    After general anesthesia or intervenous sedation, for 24 hours or while taking prescription Narcotics:  · Limit your activities  · Do not drive or operate hazardous machinery  · If you have not urinated within 8 hours after discharge, please contact your surgeon on call.   · Do not make important personal or business decisions  · Do not drink alcoholic beverages    Report the following to your surgeon:  · Excessive pain, swelling, redness or odor of or around the surgical area  · Temperature over 100.5 degrees  · Nausea and vomiting lasting longer than 4 hours or if unable to take medication  · Any signs of decreased circulation or nerve impairment to extremity:  Change in color, persistent numbness, tingling, coldness or increased pain.   · Any questions

## 2021-09-22 ENCOUNTER — VIRTUAL VISIT (OUTPATIENT)
Dept: SURGERY | Age: 26
End: 2021-09-22
Payer: COMMERCIAL

## 2021-09-22 DIAGNOSIS — Z09 POSTOPERATIVE EXAMINATION: Primary | ICD-10-CM

## 2021-09-22 PROCEDURE — 99024 POSTOP FOLLOW-UP VISIT: CPT | Performed by: SURGERY

## 2021-09-22 NOTE — PROGRESS NOTES
1. Have you been to the ER, urgent care clinic since your last visit? Hospitalized since your last visit? no    2. Have you seen or consulted any other health care providers outside of the [address] [place] [address] since your last visit? Include any pap smears or colon screening.  no

## 2021-09-22 NOTE — PROGRESS NOTES
Surgery Progress Note    9/22/2021    CC: Post op    Subjective:     Patient doing well after excision of left leg mass. No pain. Consent:  The patient and/or their healthcare decision maker is aware that this patient-initiated Telehealth encounter is a billable service, with coverage as determined by the patient's insurance carrier. They are aware that they may receive a bill and has provided verbal consent to proceed: Yes     This virtual visit was conducted via [employer]. Pursuant to the emergency declaration under the [facility] waiver authority and the [employer] Act, this Virtual  Visit was conducted to reduce the patient's risk of exposure to COVID-19 and provide continuity of care for an established patient. Services were provided through a video synchronous discussion virtually to substitute for in-person clinic visit. Due to this being a TeleHealth evaluation, many elements of the physical examination are unable to be assessed. Constitutional: No fever or chills  Neurologic: No headache  Eyes: No scleral icterus or irritated eyes  Nose: No nasal pain or drainage  Mouth: No oral lesions or sore throat  Cardiac: No palpations or chest pain  Pulmonary: No cough or shortness of breath  Gastrointestinal: No nausea, emesis, diarrhea, or constipation  Genitourinary: No dysuria  Musculoskeletal: No muscle or joint tenderness  Skin: No left leg pain  Psychiatric: No anxiety or depressed mood    Objective: There were no vitals taken for this visit.     General: No acute distress, conversant      Assessment:     77-year-old male doing well after excision of left anterior shin mass    Plan:     Pathology reviewed-schwannomabenign  No restrictions  Follow-up as needed    Jorge Mckeon MD  Bariatric and General Surgeon  [facility]

## 2022-03-19 PROBLEM — F41.9 ANXIETY: Status: ACTIVE | Noted: 2018-02-26

## 2022-03-19 PROBLEM — Z72.51 HIGH RISK SEXUAL BEHAVIOR: Status: ACTIVE | Noted: 2018-02-26

## 2022-03-20 PROBLEM — R63.6 UNDERWEIGHT: Status: ACTIVE | Noted: 2018-02-26

## 2023-05-16 RX ORDER — IBUPROFEN 600 MG/1
TABLET ORAL
COMMUNITY
Start: 2018-05-30

## 2023-05-16 RX ORDER — LAMOTRIGINE 150 MG/1
300 TABLET ORAL DAILY
COMMUNITY

## 2023-05-16 RX ORDER — TRAZODONE HYDROCHLORIDE 50 MG/1
50 TABLET ORAL
COMMUNITY

## (undated) DEVICE — BLADE ASSEMB CLP HAIR FINE --

## (undated) DEVICE — SUTURE VCRL SZ 2-0 L27IN ABSRB UD L26MM SH 1/2 CIR J417H

## (undated) DEVICE — SOLUTION IRRIG 1000ML 0.9% SOD CHL USP POUR PLAS BTL

## (undated) DEVICE — PENCIL SMK EVAC 10 FT BLADE ELECTRD ROCKER FOR TELSCP

## (undated) DEVICE — DRAPE,UTILTY,TAPE,15X26, 4EA/PK: Brand: MEDLINE

## (undated) DEVICE — GARMENT,MEDLINE,DVT,INT,CALF,MED, GEN2: Brand: MEDLINE

## (undated) DEVICE — TOTAL BASIC: Brand: MEDLINE INDUSTRIES, INC.

## (undated) DEVICE — DRAPE,LAPAROTOMY,PED,STERILE: Brand: MEDLINE

## (undated) DEVICE — TOWEL SURG W17XL27IN STD BLU COT NONFENESTRATED PREWASHED

## (undated) DEVICE — SUTURE VCRL SZ 3-0 L27IN ABSRB UD L26MM SH 1/2 CIR J416H

## (undated) DEVICE — SPONGE GZ W4XL4IN COT 12 PLY TYP VII WVN C FLD DSGN

## (undated) DEVICE — SYR 10ML LUER LOK 1/5ML GRAD --

## (undated) DEVICE — SUTURE PERMAHAND SZ 2-0 L18IN NONABSORBABLE BLK L26MM PS 1588H

## (undated) DEVICE — SUTURE MCRYL SZ 4-0 L27IN ABSRB UD L19MM PS-2 1/2 CIR PRIM Y426H

## (undated) DEVICE — DERMABOND SKIN ADH 0.7ML -- DERMABOND ADVANCED 12/BX

## (undated) DEVICE — PREP SKN CHLRAPRP APL 26ML STR --